# Patient Record
Sex: MALE | NOT HISPANIC OR LATINO | URBAN - METROPOLITAN AREA
[De-identification: names, ages, dates, MRNs, and addresses within clinical notes are randomized per-mention and may not be internally consistent; named-entity substitution may affect disease eponyms.]

---

## 2017-10-14 ENCOUNTER — EMERGENCY (EMERGENCY)
Facility: HOSPITAL | Age: 57
LOS: 1 days | Discharge: LEFT BEFORE DISCHARGE | End: 2017-10-14
Attending: EMERGENCY MEDICINE | Admitting: OPHTHALMOLOGY
Payer: SELF-PAY

## 2017-10-14 VITALS
SYSTOLIC BLOOD PRESSURE: 191 MMHG | RESPIRATION RATE: 18 BRPM | HEIGHT: 68 IN | DIASTOLIC BLOOD PRESSURE: 125 MMHG | TEMPERATURE: 97 F | OXYGEN SATURATION: 96 % | WEIGHT: 205.91 LBS | HEART RATE: 84 BPM

## 2017-10-14 VITALS
HEART RATE: 84 BPM | SYSTOLIC BLOOD PRESSURE: 199 MMHG | OXYGEN SATURATION: 97 % | DIASTOLIC BLOOD PRESSURE: 130 MMHG | RESPIRATION RATE: 18 BRPM

## 2017-10-14 DIAGNOSIS — R51 HEADACHE: ICD-10-CM

## 2017-10-14 DIAGNOSIS — I10 ESSENTIAL (PRIMARY) HYPERTENSION: ICD-10-CM

## 2017-10-14 DIAGNOSIS — R03.0 ELEVATED BLOOD-PRESSURE READING, WITHOUT DIAGNOSIS OF HYPERTENSION: ICD-10-CM

## 2017-10-14 PROCEDURE — 99053 MED SERV 10PM-8AM 24 HR FAC: CPT

## 2017-10-14 PROCEDURE — 99282 EMERGENCY DEPT VISIT SF MDM: CPT

## 2017-10-14 PROCEDURE — 99283 EMERGENCY DEPT VISIT LOW MDM: CPT | Mod: 25

## 2017-10-14 NOTE — ED ADULT NURSE NOTE - NS ED NURSE ELOPE COMMENTS
Pt uncooperative, refused care, refused blood draw, refused to change.  Pt left ED without telling ED staff.

## 2017-10-14 NOTE — ED ADULT TRIAGE NOTE - ARRIVAL INFO ADDITIONAL COMMENTS
c.o "hypertension" all day. c.o "slight headache" at this time. denies any chest pain, sob, palpitations, blurry vision, n/v. denies use of htn meds or dx of hypertension. steady gait noted. pt is not cooperative with staff. pt refuses to be placed on cm and refused to go to assigned room. states "im not staying here... in this open space". araceli barcenas and md rivero notified.

## 2017-10-14 NOTE — ED ADULT NURSE NOTE - OBJECTIVE STATEMENT
Pt BIBA for hypertension.  Pt refuses to give history of current illness, refuses to change into gown, refuses blood draw, refuses to acknowledge RN.  Pt is alert and oriented x3, pt only reports hypertension, refuses to answer evaluation questions.  Pt is ambulatory with even gait.

## 2017-10-14 NOTE — ED ADULT NURSE REASSESSMENT NOTE - NS ED NURSE REASSESS COMMENT FT1
Primary RN at bedside to convince Pt to consent to lab draw and treatment. Pt consents then refuses two times.  Pt refuses to acknowledge primary RN, refuses to change.  MD Coy is aware.
Pt refuses xray, PT refuses to acknowledge primary RN, refuses to change, refuses IV at this time. MD Coy made aware.
pt is uncooperative with RN evaluation and refuses IV blood draw, refuses to change into gown. Pt refuses to give history of current illness.  Pt states he wants to leave.  MD made aware.
Pt uncooperative, refused all care and interventions.  Pt ambulatory with even gait, alert and oriented x3.  Pt left ED at [id number] without telling ED staff.

## 2017-10-15 NOTE — ED PROVIDER NOTE - MEDICAL DECISION MAKING DETAILS
pt with elevated BP  - N/V x 2 episodes in ED- pt refusing labs and imaging - states he is fine- then apparently eloped from ED

## 2017-10-15 NOTE — ED PROVIDER NOTE - OBJECTIVE STATEMENT
56 yo male with hx of HTN ? renal insufficiency, states he stopped taking Procardia approx 9 month sago - staes he has had a headache  x 1 day- no neck pain or visual changes- no abd pain or back pain or chest pain or sob - is making normal amounts of urine  pos Fam hx of MI/CAD- Dad.
